# Patient Record
(demographics unavailable — no encounter records)

---

## 2024-10-21 NOTE — HISTORY OF PRESENT ILLNESS
[Stable] : stable [de-identified] : 74 year old female presents for evaluation of right sided back pain x several months.  She had similar pain a few years ago which resolved on its own. She states that she has intermittent radiating pain down the RLE to the anterior and posterior thigh. She states that she mostly has back pain. Has numbness of the hands and feet. Laying down, sitting aggravates the pain. Standing alleviates the pain. She takes tramadol PRN prescribed by her PCP, as well as CBD. Also takes tylenol for the pain.  Can not take NSAIDs due to gastritis.  Denies PRAMOD. Was referred to PT at last visit. Has been participating with PT twice a week for the past 2 weeks but is feeling worse.  She now has pain for the past week radiating up the back and down the ulnar aspect of the LUE to the fingers, with numbness/tingling. Denies weakness.  Has MRI Lumbar.  PMHx: gastritis, HTN, HLD, history of breast cancer s/p chemo/RT  No fever chills sweats nausea vomiting no bowel or bladder dysfunction, no recent weight loss or gain no night pain. This history is in addition to the intake form that I personally reviewed.

## 2024-10-21 NOTE — DISCUSSION/SUMMARY
[de-identified] : L5-S1 spondylolisthesis with moderate-severe stenosis. L4-5 moderate stenosis. Lumbar disc degenerative disease. Cervical degenerative disc disease. +left rotator cuff pathology. Discussed all options. Discussed exercise do's and don'ts. Referral for physical therapy. All options discussed including rest, medicine, home exercise, acupuncture, Chiropractic care, Physical Therapy, Pain management, and last resort surgery. All questions were answered, all alternatives discussed, and the patient is in complete agreement with the treatment plan which the patient contributed to and discussed with me through the shared decision-making process. Follow-up appointment as instructed. Any issues and the patient will call or come in sooner.  agrees with plan.

## 2024-10-21 NOTE — PHYSICAL EXAM
[Normal] : Gait: normal [Sandhu's Sign] : negative Sandhu's sign [Pronator Drift] : negative pronator drift [SLR] : negative straight leg raise [de-identified] : 5 out of 5 motor strength, sensation is intact and symmetrical full range of motion flexion extension and rotation, no palpatory tenderness full range of motion of hips knees shoulders and elbows (all four extremities), no atrophy, negative straight leg raise, no pathological reflexes, no swelling, normal ambulation, no apparent distress skin is intact, no palpable lymph nodes, no upper or lower extremity instability, alert and oriented x3 and normal mood. Normal finger-to nose test. No upper motor neuron findings. Pain with cervical extension. +Left Hawkin's +Left Neer's +Drop arm test [de-identified] : XR AP Lat Cervical 10/21/2024 -cervical degenerative disc disease- reviewed with patient.   XR AP Lat Lumbar 09/24/2024 -L5-S1 spondylolisthesis, Lumbar disc degenerative disease- reviewed with patient.   I reviewed, interpreted and clinically correlated the following outside imaging studies, MR SPINE LUMBAR - ORDERED BY: CANDIS CHAPA   PROCEDURE DATE: 09/25/2024    INTERPRETATION: CLINICAL INFORMATION: Back pain  ADDITIONAL CLINICAL INFORMATION: Spondylolisthesis M43.16  TECHNIQUE: Multiplanar, multisequence MRI was performed of the lumbar spine. IV Contrast: NONE  PRIOR STUDIES: MRI of the lumbar spine performed 8/24/2018  FINDINGS:  LOCALIZER: No additional findings. BONES: There is no fracture. There are Modic type I changes at L1-L2 and L3-L4. ALIGNMENT: There is levocurvature of the lumbar spine. There is mild/moderate retrolisthesis at L1-L2 and less so at L2-L3. There is grade 1/2 anterolisthesis at L5-S1. SACROILIAC JOINTS/SACRUM: There is no sacral fracture. The SI joints are partially visualized but are intact. CONUS AND CAUDA EQUINA: The distal cord and conus are normal in signal. Conus terminates at L1. VISUALIZED INTRAPELVIC/INTRA-ABDOMINAL SOFT TISSUES: Normal. PARASPINAL SOFT TISSUES: Normal.   INDIVIDUAL LEVELS:  LOWER THORACIC SPINE: No spinal canal or neuroforaminal stenosis.  L1-L2: Mild/moderate retrolisthesis. Severe disc height loss, endplate osteophyte formation, and broad-based disc bulge with superimposed paracentral disc extrusion. Findings result in mild spinal canal stenosis in the AP dimension. Bilateral facet arthropathy is present. Moderate right greater than left neural foraminal stenosis. L2-L3: Mild retrolisthesis. Moderate disc height loss, endplate osteophyte formation, and broad-based disc bulge resulting in flattening of the ventral thecal sac. There is mild spinal canal stenosis. Bilateral facet arthropathy is present. There is mild left neural foraminal stenosis. No right neural foraminal stenosis. L3-L4: Moderate disc height loss, endplate osteophyte formation, and broad-based disc bulge resulting in mild-to-moderate spinal canal stenosis. Bilateral facet arthropathy. Moderate left greater than right neural foraminal stenosis. L4-L5: Moderate severe disc height loss, endplate osteophyte formation, and broad-based disc bulge resulting in flattening the ventral thecal sac. Moderate spinal canal stenosis. Moderate left greater than right neural foraminal stenosis. L5-S1: Grade 1/2 anterolisthesis. Moderate disc height loss, endplate osteophyte formation, and broad-based disc bulge resulting in moderate to severe spinal canal stenosis. Bilateral facet arthropathy. Moderate bilateral neural foraminal stenosis.   IMPRESSION:  MRI of the lumbar spine demonstrates multilevel spondylosis with varying degrees of mild-to-moderate spinal canal and neural foraminal stenosis as described. Overall disease is progressed compared to prior MRI performed 8/24/2018.  At L1-L2 there is interval increase in mild/moderate retrolisthesis and disc height loss. Moderate right greater than left neural foraminal stenosis, slightly increased.  L3-L4, there is interval increase in degenerative disc disease. Mild/moderate spinal canal stenosis and moderate left greater than right neural foraminal stenosis, increased from prior.  L4-L5, there is moderate spinal canal stenosis and moderate left and right neural foraminal stenosis, unchanged.  L5-S1, there is grade 1/2 anterolisthesis with moderate to severe spinal canal stenosis and moderate bilateral neural foraminal stenosis, increased from prior study.  --- End of Report ---

## 2024-10-21 NOTE — ADDENDUM
[FreeTextEntry1] : This note was written by Landen Florez on 10/21/2024 acting as scribe for Dr. Seamus Barbosa M.D.  I, Seamus Barbosa MD, have read and attest that all the information, medical decision making and discharge instructions within are true and accurate.

## 2024-10-28 NOTE — ASSESSMENT
[FreeTextEntry1] : 74 year old woman with osteoporosis, on letrozole for breast cancer, treated with prolia since 3/2016  -  DXA Performed today, pt with artifactual changes in spine, stable at wrist and and T hip.     - Continue Prolia,  administered today without complication, [source:stock]  - repeat dxa  in 2 years  - check cmp   Vitamin D def'y  - continue vitamin D supplementation  HTN -  Blood pressure at goal.  Continue amlodipine  f/u 6 months for prolia

## 2024-10-28 NOTE — PHYSICAL EXAM
[Alert] : alert [Healthy Appearance] : healthy appearance [No Acute Distress] : no acute distress [Normal Sclera/Conjunctiva] : normal sclera/conjunctiva [No Proptosis] : no proptosis [No LAD] : no lymphadenopathy [Thyroid Not Enlarged] : the thyroid was not enlarged [No Respiratory Distress] : no respiratory distress [Clear to Auscultation] : lungs were clear to auscultation bilaterally [Normal S1, S2] : normal S1 and S2 [Regular Rhythm] : with a regular rhythm [No Edema] : no peripheral edema [Normal Bowel Sounds] : normal bowel sounds [Not Tender] : non-tender [Soft] : abdomen soft [No Spinal Tenderness] : no spinal tenderness [Kyphosis] : kyphosis present [No Involuntary Movements] : no involuntary movements were seen [Normal Strength/Tone] : muscle strength and tone were normal [Normal Reflexes] : deep tendon reflexes were 2+ and symmetric [No Tremors] : no tremors [Normal Affect] : the affect was normal [Normal Mood] : the mood was normal

## 2024-10-28 NOTE — HISTORY OF PRESENT ILLNESS
[FreeTextEntry1] : cc: osteoporosis  74 year old woman with history of breast cancer,  on letrozole, with osteoporosis, treated with prolia since 3/2016.   She takes supplemental vitamin D and calcium, consumes minimal dairy in her diet, . She reports no recent falls or fractures.    She has regular dental care . Reports no thigh pain.   Has been exercising daily Has had back and arm pain, tendonitis.

## 2024-11-01 NOTE — HISTORY OF PRESENT ILLNESS
[FreeTextEntry1] : RPV- lichen planus [de-identified] : Ms. CARMELO BOND  is a 74 year  y/o F with hx of stage 4 breast cancer (since 2003 on letrozole) here for evaluation of below   # F/u Lichen planus Since LV 04/2024, patient notes no improvement in lesions on legs and says they are progressing to arms now. Very itchy especially at night.  Continues to have oral involvement but asymptomatic. Using topical clobetasol very rarely PRN to aa on legs, discontinued use as endorses skin thinning.  on NBUVB most recently 1000 mJ 1 m 48 seconds for the last 6-7 tx; 23 total tx so far not getting red, itchy, or burning after phototherapy thinks she is starting to improve maybe the itch of her lesions is getting better, mostly on legs  Background: rash on the b/l shins began in 2017. At the time she had bx confirming diagnosis of LP. Since then she has had flares on/off and used topical corticosteroids with limited improvement, and ILK which seems to give her more lasting improvement but was difficult to tolerate due to numerous affected areas on the arms and legs. More recently in the last few months she has been flaring and developing new itchy red spots on the legs and arms that are a 15 out of 10 in itch intensity especially at night, causing her to wake up in the night due to itch. No itchy household contacts. She follows with Dr. Alves (oral pathology) for her oral LP which she feels has been mostly stable with no treatment and only rarely flares. Denies any genital pain or lesions, and follows with GYN regularly.  Of note, in the last few months the patient reports taking new blood pressure and cholesterol medication but does not recall some of the specific names.   Personal hx of skin cancer: no FHx of skin cancer: no Social Hx: here accompanied by her .

## 2024-11-01 NOTE — PHYSICAL EXAM
[Alert] : alert [Oriented x 3] : ~L oriented x 3 [Well Nourished] : well nourished [Conjunctiva Non-injected] : conjunctiva non-injected [No Visual Lymphadenopathy] : no visual  lymphadenopathy [No Clubbing] : no clubbing [No Edema] : no edema [No Bromhidrosis] : no bromhidrosis [No Chromhidrosis] : no chromhidrosis [FreeTextEntry3] : Wickman stria in the buccal cheek's b/l and lateral aspect of the tongue scattered pink to purple flat topped papules on the distal arms and legs, (legs> arms) some in linear distribution and some excoriations atrophic plaque on R shin

## 2024-11-01 NOTE — ASSESSMENT
[FreeTextEntry1] : # Lichen Planus--Chronic, progressive Biopsy supportive of lichenoid drug reaction, however medication history unremarkable. Given clinical history or exam, favor treating for LP as below Failed tcs  We have discussed the nature and course of this condition. I have discussed the goals of therapy with the patient. We have discussed treatment options and expectations from treatment including topical vs ILK, phototherapy, excimer laser, and systemic immunosuppression with MTX, Mycophenolate Mofetil or acitretin. Patient hesitant to begin systemic immunosuppression due to cancer history and prefers to begin treatment with phototherapy. Will cont phototherapy as below. - For AA on the body- May c/w clobet ointment BID for 2 weeks on 1 week off. Clobetasol wraps at night prn  - long term steroid use side effects such as skin atrophy, hypopigmentation and telangiectasis discussed previously, pt uses sparingly already because she is worried - encouraged pt to use TCS under occlusion for 3-5 days and she is bleeding from scratching  - c/w phototherapy 2-3x/week, INCREASE to 1500 mJ max dose; ensure mineral oil application during treatments  RTC 2 months

## 2024-11-01 NOTE — HISTORY OF PRESENT ILLNESS
[FreeTextEntry1] : RPV- lichen planus [de-identified] : Ms. CARMELO BOND  is a 74 year  y/o F with hx of stage 4 breast cancer (since 2003 on letrozole) here for evaluation of below   # F/u Lichen planus Since LV 04/2024, patient notes no improvement in lesions on legs and says they are progressing to arms now. Very itchy especially at night.  Continues to have oral involvement but asymptomatic. Using topical clobetasol very rarely PRN to aa on legs, discontinued use as endorses skin thinning.  on NBUVB most recently 1000 mJ 1 m 48 seconds for the last 6-7 tx; 23 total tx so far not getting red, itchy, or burning after phototherapy thinks she is starting to improve maybe the itch of her lesions is getting better, mostly on legs  Background: rash on the b/l shins began in 2017. At the time she had bx confirming diagnosis of LP. Since then she has had flares on/off and used topical corticosteroids with limited improvement, and ILK which seems to give her more lasting improvement but was difficult to tolerate due to numerous affected areas on the arms and legs. More recently in the last few months she has been flaring and developing new itchy red spots on the legs and arms that are a 15 out of 10 in itch intensity especially at night, causing her to wake up in the night due to itch. No itchy household contacts. She follows with Dr. Alves (oral pathology) for her oral LP which she feels has been mostly stable with no treatment and only rarely flares. Denies any genital pain or lesions, and follows with GYN regularly.  Of note, in the last few months the patient reports taking new blood pressure and cholesterol medication but does not recall some of the specific names.   Personal hx of skin cancer: no FHx of skin cancer: no Social Hx: here accompanied by her .

## 2024-11-16 NOTE — ASSESSMENT
[FreeTextEntry1] : 73 y/o female who was diagnosis of stage III breast cancer at age 40 in 1990. She underwent mastectomy and left axillary lymph node dissection with 11/12 axillary nodes involved with metastatic disease. The patient received adjuvant chemotherapy over a 3-1/2 year period from Dr. Bhanu Mg including Cytoxan Adriamycin 5-FU methotrexate and BCG. She subsequently took tamoxifen for 10 years followed by raloxifene for 2 years, which was discontinued in July 2003, at the time of her recurrence. In July 2003 she developed a chest wall recurrence. This was excised with negative margins. She then received radiation therapy. The patient commenced treatment with letrozole in December 2003, which she continues to take to the present time.   - doing well on letrozole without AEs - given lack of severe side-effects, will continue for now as she was treated for metastatic chest wall recurrence - VISHAL x >20 years from chest wall recurrence -No longer following with breast surgeon Dr. Lima. -Labs today including chem panel, CA27-29  Osteoporosis - continue f/up with endocrinology Dr. Wolf - continue Prolia per endocrinology every 6 monnths - BMD from 10/2024 reviewed; repeat annually as per Dr. Wolf  - Patient had the opportunity to have all their questions answered to their satisfaction - f/u in 6 months or sooner if needed .

## 2024-11-16 NOTE — HISTORY OF PRESENT ILLNESS
[M: ___] : M[unfilled] [AJCC Stage: ____] : AJCC Stage: [unfilled] [Treatment Protocol] : Treatment Protocol [100: Normal, no complaints, no evidence of disease.] : 100: Normal, no complaints, no evidence of disease. [de-identified] : 73 y/o female who presents for breast medical oncology follow up.  The patient had a diagnosis of stage III breast cancer at age 40 in 1990. She underwent mastectomy and left axillary lymph node dissection with 11/12 axillary nodes involved with disease.  The patient received adjuvant chemotherapy over a 3-1/2 year period from Dr. Bhanu Mg including Cytoxan Adriamycin 5-FU methotrexate and BCG. She subsequently took tamoxifen for 10 years followed by raloxifene for 2 years, which was discontinued in July 2003, at the time of her recurrence.  July 2003 she developed a chest wall recurrence. This was excised with negative margins. She then received radiation therapy.  The patient commenced treatment with letrozole in December 2003, which she continues to take to the present time.  The patient has had no evidence of active breast cancer since starting letrozole.  Comprehensive BRACAnalysis was negative in May 2008. CancerNext 32 gene panel was negative on 12/1/2015.   [de-identified] : Infiltrating ductal carcinoma [FreeTextEntry1] :  Letrozole start 12/2003  to present [de-identified] : 11/15/24 Patient presents today to rule out metastatic breast cancer and assess treatment toxicity. She is doing well on letrozole without AEs (started 12/2003) after chest wall recurrence Unilateral Right mammo/sono  3/2024- BIRADS 2  Left axillary U/S in 8/2024 was negative for any clear finding;  Patient denies any SOB, CP, abdominal pain, bone pain, headache, or unexplained weight loss Patient denies any hotflashes, arthralgias, vaginal dryness, vaginal bleeding, hair loss, muscle cramps.  HEALTH MAINTENANCE  PCP Dr. Aba Wallace, 1/2023 OPHTHO cataract surgery  GI Cscope Dr Price, yearly colonoscopies due to precancerous polyps - last 2021 - PAST due**  GYN Dr. Dinero, 11/29/21 no vaginal bleeding or spotting. Has not followed up since  DERM  Dx: lichen planus - treated w/ cortisone cream; seen by dermatologist one year ago;  BMD previously with osteoporosis on Prolia every 6 months with Dr. Shelby Wolf, on calcium/vit D, last BMD 10/2024 continued improvement on Prolia every 6 months;

## 2024-11-16 NOTE — REASON FOR VISIT
[Follow-Up Visit] : a follow-up [FreeTextEntry2] : Metastatic breast cancer with chest wall recurrence

## 2024-11-16 NOTE — ASSESSMENT
[FreeTextEntry1] : 75 y/o female who was diagnosis of stage III breast cancer at age 40 in 1990. She underwent mastectomy and left axillary lymph node dissection with 11/12 axillary nodes involved with metastatic disease. The patient received adjuvant chemotherapy over a 3-1/2 year period from Dr. Bhanu Mg including Cytoxan Adriamycin 5-FU methotrexate and BCG. She subsequently took tamoxifen for 10 years followed by raloxifene for 2 years, which was discontinued in July 2003, at the time of her recurrence. In July 2003 she developed a chest wall recurrence. This was excised with negative margins. She then received radiation therapy. The patient commenced treatment with letrozole in December 2003, which she continues to take to the present time.   - doing well on letrozole without AEs - given lack of severe side-effects, will continue for now as she was treated for metastatic chest wall recurrence - VISHAL x >20 years from chest wall recurrence -No longer following with breast surgeon Dr. Lima. -Labs today including chem panel, CA27-29  Osteoporosis - continue f/up with endocrinology Dr. Wolf - continue Prolia per endocrinology every 6 monnths - BMD from 10/2024 reviewed; repeat annually as per Dr. Wolf  - Patient had the opportunity to have all their questions answered to their satisfaction - f/u in 6 months or sooner if needed .

## 2024-11-16 NOTE — PHYSICAL EXAM
[Fully active, able to carry on all pre-disease performance without restriction] : Status 0 - Fully active, able to carry on all pre-disease performance without restriction [Normal] : full range of motion and no deformities appreciated [de-identified] : Left  mastectomy with silicone implant reconstruction, no chest wall masses.  Right breast: reduction mammoplasty with well healed incisions, no mass, right axilla small mobile LN (unchanged) [de-identified] : no palpable skin lesions

## 2024-11-16 NOTE — PHYSICAL EXAM
[Fully active, able to carry on all pre-disease performance without restriction] : Status 0 - Fully active, able to carry on all pre-disease performance without restriction [Normal] : full range of motion and no deformities appreciated [de-identified] : Left  mastectomy with silicone implant reconstruction, no chest wall masses.  Right breast: reduction mammoplasty with well healed incisions, no mass, right axilla small mobile LN (unchanged) [de-identified] : no palpable skin lesions

## 2024-11-16 NOTE — REVIEW OF SYSTEMS
[de-identified] : Here for cpe 1. Thyroid nodule due for f/u u/s 2. Hx hodgkins treated 7y ago. abvd. gets serial ekg, echo, labs, vaccines, pfts at [facility] [place]. saw np this .  3. delivered healthy baby 4m ago. . doing well. breastfeeding. moods doing well. pnv. back to work. declines contraception. periods haven't resumed. ok with getting pregnant again.  4. hm utd derm pap vaccines. due for ophtho. utd dental. fhx skin cancer.  5. leg swelling pregnancy resolving.  [Negative] : Allergic/Immunologic [de-identified] : lichen planus

## 2024-11-16 NOTE — HISTORY OF PRESENT ILLNESS
[M: ___] : M[unfilled] [AJCC Stage: ____] : AJCC Stage: [unfilled] [Treatment Protocol] : Treatment Protocol [100: Normal, no complaints, no evidence of disease.] : 100: Normal, no complaints, no evidence of disease. [de-identified] : 73 y/o female who presents for breast medical oncology follow up.  The patient had a diagnosis of stage III breast cancer at age 40 in 1990. She underwent mastectomy and left axillary lymph node dissection with 11/12 axillary nodes involved with disease.  The patient received adjuvant chemotherapy over a 3-1/2 year period from Dr. Bhanu Mg including Cytoxan Adriamycin 5-FU methotrexate and BCG. She subsequently took tamoxifen for 10 years followed by raloxifene for 2 years, which was discontinued in July 2003, at the time of her recurrence.  July 2003 she developed a chest wall recurrence. This was excised with negative margins. She then received radiation therapy.  The patient commenced treatment with letrozole in December 2003, which she continues to take to the present time.  The patient has had no evidence of active breast cancer since starting letrozole.  Comprehensive BRACAnalysis was negative in May 2008. CancerNext 32 gene panel was negative on 12/1/2015.   [de-identified] : Infiltrating ductal carcinoma [FreeTextEntry1] :  Letrozole start 12/2003  to present [de-identified] : 11/15/24 Patient presents today to rule out metastatic breast cancer and assess treatment toxicity. She is doing well on letrozole without AEs (started 12/2003) after chest wall recurrence Unilateral Right mammo/sono  3/2024- BIRADS 2  Left axillary U/S in 8/2024 was negative for any clear finding;  Patient denies any SOB, CP, abdominal pain, bone pain, headache, or unexplained weight loss Patient denies any hotflashes, arthralgias, vaginal dryness, vaginal bleeding, hair loss, muscle cramps.  HEALTH MAINTENANCE  PCP Dr. Aba Wallace, 1/2023 OPHTHO cataract surgery  GI Cscope Dr Price, yearly colonoscopies due to precancerous polyps - last 2021 - PAST due**  GYN Dr. Dinero, 11/29/21 no vaginal bleeding or spotting. Has not followed up since  DERM  Dx: lichen planus - treated w/ cortisone cream; seen by dermatologist one year ago;  BMD previously with osteoporosis on Prolia every 6 months with Dr. Shelby Wolf, on calcium/vit D, last BMD 10/2024 continued improvement on Prolia every 6 months;

## 2025-01-31 NOTE — HISTORY OF PRESENT ILLNESS
[FreeTextEntry1] : RPV- lichen planus [de-identified] : Ms. CARMELO BOND  is a 74 year  y/o F with hx of stage 4 breast cancer (since 2003 on letrozole) here for evaluation of below   # F/u Lichen planus Since LV patient notes minimal improvement in lesions on legs, not using topicals much, just reached goal NBUVB 1500mJ dose 6 weeks ago Continues to have oral involvement but asymptomatic.  not getting red after phototherapy but does get itchy  Background: rash on the b/l shins began in 2017. At the time she had bx confirming diagnosis of LP. Since then she has had flares on/off and used topical corticosteroids with limited improvement, and ILK which seems to give her more lasting improvement but was difficult to tolerate due to numerous affected areas on the arms and legs. More recently in the last few months she has been flaring and developing new itchy red spots on the legs and arms that are a 15 out of 10 in itch intensity especially at night, causing her to wake up in the night due to itch. No itchy household contacts. She follows with Dr. Alves (oral pathology) for her oral LP which she feels has been mostly stable with no treatment and only rarely flares. Denies any genital pain or lesions, and follows with GYN regularly.  Of note, in the last few months the patient reports taking new blood pressure and cholesterol medication but does not recall some of the specific names.   Personal hx of skin cancer: no FHx of skin cancer: no Social Hx: here accompanied by her .

## 2025-01-31 NOTE — PHYSICAL EXAM
[Alert] : alert [Oriented x 3] : ~L oriented x 3 [Well Nourished] : well nourished [Conjunctiva Non-injected] : conjunctiva non-injected [No Visual Lymphadenopathy] : no visual  lymphadenopathy [No Clubbing] : no clubbing [No Edema] : no edema [No Bromhidrosis] : no bromhidrosis [No Chromhidrosis] : no chromhidrosis [FreeTextEntry3] : Wickman stria in the buccal cheek's b/l and lateral aspect of the tongue scattered pink to purple flat topped papules on the distal arms and legs, (legs> arms) some in linear distribution and some excoriations

## 2025-05-06 NOTE — ASSESSMENT
[FreeTextEntry1] : 74 year old woman with osteoporosis, on letrozole for breast cancer, treated with prolia since 3/2016  -  DXA  last visit with artifactual changes in spine, stable at wrist and and T hip.     - Continue Prolia,  administered today without complication, [source:stock]  - repeat dxa  in 1.5 years  - check cmp   Vitamin D def'y  - continue vitamin D supplementation  HTN -  .  Continue amlodipine  f/u 6 months for prolia

## 2025-05-06 NOTE — HISTORY OF PRESENT ILLNESS
[FreeTextEntry1] : cc: osteoporosis  74 year old woman with history of breast cancer,  on letrozole, with osteoporosis, treated with prolia since 3/2016. She fell over her luggage while traveling, no fractures.   She takes supplemental vitamin D and calcium, consumes minimal dairy in her diet. Has been exercising daily   She has regular dental care, will need root canal . Reports no thigh pain.

## 2025-05-13 NOTE — DISCUSSION/SUMMARY
[FreeTextEntry1] : CARMELO is here for NBUVB therapy treatment for lichen planus L 43.9 Starting dose is 250 mJ increasing by 10% as tolerated three times a week, holding dose at 1000 mJ as Dr Riley prescribed. Patient is evaluated by Dr. Riley to continue tx 2 x per week increasing dose to 1500Mj.  Pt tolerated treatment well, mineral oil applied.  Today's treatment:  5/13/25 1504 mJ 2 m 36 seconds. (Moseley 2 UVB output is 14.8) treatment history:  5/8/25 1504 mJ 2 m 36 seconds. (Moseley 2 UVB output is 14.8)  5/1/25 1504 mJ 2 m 36 seconds. (Moseley 2 UVB output is 14.8) 4/29/25 1504 mJ 2 m 36 seconds. (Moseley 2 UVB output is 14.8)  4/24/25 1504 mJ 2 m 36 seconds. (Moseley 2 UVB output is 14.8) 4/22/25 1504 mJ 2 m 36 seconds. (Moseley 2 UVB output is 14.8)  4/17/25 1504 mJ 2 m 36 seconds. (Moseley 2 UVB output is 14.8) 4/10/25 1500 mJ 6 m 50 seconds. (Moseley 1 UVB OUTPUT is 5.2)   4/8/25 1500 mJ 6 m 50 seconds. (Moseley 1 UVB OUTPUT is 5.2)    4/3/25 1500 mJ 6 m 50 seconds. (Moseley 1 UVB OUTPUT is 5.2)    4/1/25 1500 mJ 6 m 50 seconds. (Moseley 1 UVB OUTPUT is 5.2)    3/25/25 1500 mJ 6 m 50 seconds. (Moseley 1 UVB OUTPUT is 5.2)    3/18/25 1500 mJ 6 m 50 seconds. (Moseley 1 UVB OUTPUT is 5.2)    3/13/25 1500 mJ 6 m 50 seconds. (Moseley 1 UVB OUTPUT is 5.2)    3/11/25 1500 mJ 6 m 50 seconds. (Moseley 1 UVB OUTPUT is 5.2)    3/6/25 1500 mJ 6 m 50 seconds. (Moseley 1 UVB OUTPUT is 5.2)     3/4/25 1500 mJ 6 m 50 seconds. (Moseley 1 UVB OUTPUT is 5.2)  2/27/25 1500 mJ 6 m 50 seconds. (Moseley 1 UVB OUTPUT is 5.2)   2/25/25 1500 mJ 6 m 50 seconds. (Moseley 1 UVB OUTPUT is 5.2)   2/18/25 1500 mJ 6 m 50 seconds. (Moseley 1 UVB OUTPUT is 5.2)   2/13/25 1500 mJ 6 m 50 seconds. (Moseley 1 UVB OUTPUT is 5.2)    2/11/25 1500 mJ 6 m 50 seconds. (Moseley 1 UVB OUTPUT is 5.2)    2/4/25 1500 mJ 6 m 50 seconds. (Msoeley 1 UVB OUTPUT is 5.2)    1/30/25 1500 mJ 6 m 50 seconds. (Moseley 1 UVB OUTPUT is 5.2)    1/28/25 1500 mJ 6 m 50 seconds. (Moseley 1 UVB OUTPUT is 5.2)    1/14/25 1500 mJ 6 m 50 seconds. (Moseley 1 UVB OUTPUT is 5.2)   1/9/25 1500 mJ 6 m 50 seconds. (Moseley 1 UVB OUTPUT is 5.2)    1/7/25 1500 mJ 6 m 50 seconds. (Moseley 1 UVB OUTPUT is 5.2)  1/2/24 1500 mJ 6 m 50 seconds. (Moseley 1 UVB OUTPUT is 5.2)   12/31/24 1500 mJ 6 m 50 seconds. (Moseley 1 UVB OUTPUT is 5.2)   12/26/24 1500 mJ 6 m 50 seconds. (Moseley 1 UVB OUTPUT is 5.2)  12/19/24 1500 mJ 6 m 50 seconds. (Moseley 1 UVB OUTPUT is 5.2)   12/17/24 1500 mJ 6 m 50 seconds. (Moseley 1 UVB OUTPUT is 5.2)    12/12/24 1500 mJ 6 m 50 seconds. (Moseley 1 UVB OUTPUT is 5.2) 12/10/24 1500 mJ 6 m 50 seconds. (Moseley 1 UVB OUTPUT is 5.2)    11/26/24 1463 mJ 6 m 45 seconds. (Moseley 1 UVB OUTPUT is 5.2)   11/21/24 1330 mJ 6 m 10 seconds. (Moseley 1 UVB OUTPUT is 5.2)   11/19/24 1210 mJ 5 m 37 seconds. (Moseley 1 UVB OUTPUT is 5.2)   11/14/24 1099 mJ 5 m 7 seconds.  (Moseley 2 UVB output is 14.8)  11/12/24 1000 mJ 1 m 48 seconds.  (Moseley 2 UVB output is 14.8) 11/7/24 1000 mJ 1 m 48 seconds.  (Moseley 2 UVB output is 14.8) 11/5/24 1000 mJ 1 m 48 seconds.  (Moseley 2 UVB output is 14.8) 10/31/24 1000 mJ 1 m 48 seconds.  (Moseley 2 UVB output is 14.8) 10/29/24 1000 mJ 1 m 48 seconds.  (Moseley 2 UVB output is 14.8) 10/22/24 1000 mJ 1 m 48 seconds.  (Moseley 2 UVB output is 14.8)  10/22/24 1000 mJ 1 m 48 seconds.  (Moseley 2 UVB output is 14.8) 10/17/24 1000 mJ 1 m 48 seconds.  (Moseley 2 UVB output is 14.8)  10/15/24 1000 mJ 1 m 48 seconds.  (Moseley 2 UVB output is 14.8) 10/10/24 1000 mJ 1 m 48 seconds.  (Moseley 2 UVB output is 14.8) 10/8/24 1000 mJ 1 m 48 seconds.  (Moseley 2 UVB output is 14.8) 10/1/24 956 mJ 2 m 1 seconds.  (Moseley 2 UVB output is 14.8) 9/27/24 864 mJ 1 m 58 seconds.  (Moseley 2 UVB output is 14.8) 9/24/24 791 mJ 1 m 31 seconds.  (Moseley 2 UVB output is 14.8) 9/20/24 717 mJ 1 m 23 seconds.  (Moseley 2 UVB output is 14.8) 9/17/24 651 mJ 3 m 5 seconds.  (Moseley 1 UVB OUTPUT is 5.2) 9/13/24 596 mJ 1 m 25 seconds.  (Msoeley 2 UVB output is 14.8) 9/10/24 538 mJ 2 m 38 seconds. (Moseley 1 UVB OUTPUT is 5.2)   9/6/24 488 mJ 1 m 11 seconds (Moseley 2 UVB output is 14.8)  9/3/24 446 mJ 2 m 13 seconds (Moseley 1 UVB OUTPUT is 5.2)  8/30/24 407 mJ 0 m 56 seconds (Moseley 1 UVB OUTPUT is 5.2)  8/27/24 366 mJ 1 m 42 seconds (Moseley 1 UVB OUTPUT is 5.2)  8/23/24 336 mJ 0 m 48 seconds (Moseley 2 UVB output is 14.8)  8/20/24 308 mJ 0 m 39 seconds (Moseley 2 UVB output is 14.8)   8/16/24 276 mJ 0 m 40 seconds (Moseley 2 UVB output is 14.8) 8/13/24 251 mJ 1 m 14 seconds

## 2025-05-19 NOTE — HISTORY OF PRESENT ILLNESS
[FreeTextEntry1] : RPV- lichen planus [de-identified] : Ms. CARMELO BOND  is a 74 year  y/o F with hx of stage 4 breast cancer (since 2003 on letrozole) here for evaluation of below   # F/u Lichen planus Since LV patient notes minimal improvement in lesions on legs, not using topicals much, has been on NBUVB at 1500mJ x 5 months Continues to have oral involvement but asymptomatic.   Background: rash on the b/l shins began in 2017. At the time she had bx confirming diagnosis of LP. Since then she has had flares on/off and used topical corticosteroids with limited improvement, and ILK which seems to give her more lasting improvement but was difficult to tolerate due to numerous affected areas on the arms and legs. More recently in the last few months she has been flaring and developing new itchy red spots on the legs and arms that are a 15 out of 10 in itch intensity especially at night, causing her to wake up in the night due to itch. No itchy household contacts. She follows with Dr. Alves (oral pathology) for her oral LP which she feels has been mostly stable with no treatment and only rarely flares. Denies any genital pain or lesions, and follows with GYN regularly.  Of note, in the last few months the patient reports taking new blood pressure and cholesterol medication but does not recall some of the specific names.   Personal hx of skin cancer: no FHx of skin cancer: no Social Hx: here accompanied by her .

## 2025-05-19 NOTE — ASSESSMENT
[FreeTextEntry1] : # Lichen Planus--Chronic, progressive now with a component of # Prurigo Biopsy supportive of lichenoid drug reaction, however medication history unremarkable. Given clinical history or exam, favor treating for LP as below, with concomitant prurigo Failed tcs and now at least 5 months of therapeutic NBUVB We have discussed the nature and course of this condition. I have discussed the goals of therapy with the patient. We have discussed treatment options and expectations from treatment including topical vs ILK, dupixent, phototherapy, excimer laser, and systemic immunosuppression with MTX, Mycophenolate Mofetil or acitretin. Patient hesitant to begin systemic immunosuppression due to cancer history.  - For AA on the body- May c/w clobet ointment BID for 2 weeks on 1 week off. Clobetasol wraps at night prn  - long term steroid use side effects such as skin atrophy, hypopigmentation and telangiectasis discussed previously, pt uses sparingly already because she is worried - encouraged pt to use TCS under occlusion for 3-5 days and she is bleeding from scratching  - c/w phototherapy 2-3x/week,c/w 1500 mJ max dose; ensure mineral oil application during treatments - discussed Dupixent as potential tx, will evaluate insurance coverage and approval. SED including conjunctivitis, common cold, HSV.   RTC injection training

## 2025-06-13 NOTE — HISTORY OF PRESENT ILLNESS
[M: ___] : M[unfilled] [AJCC Stage: ____] : AJCC Stage: [unfilled] [Treatment Protocol] : Treatment Protocol [100: Normal, no complaints, no evidence of disease.] : 100: Normal, no complaints, no evidence of disease. [de-identified] : 75 y/o female who presents for breast medical oncology follow up.  The patient had a diagnosis of stage III breast cancer at age 40 in 1990. She underwent mastectomy and left axillary lymph node dissection with 11/12 axillary nodes involved with disease.  The patient received adjuvant chemotherapy over a 3-1/2 year period from Dr. Bhanu Mg including Cytoxan Adriamycin 5-FU methotrexate and BCG. She subsequently took tamoxifen for 10 years followed by raloxifene for 2 years, which was discontinued in July 2003, at the time of her recurrence.  July 2003 she developed a chest wall recurrence. This was excised with negative margins. She then received radiation therapy.  The patient commenced treatment with letrozole in December 2003, which she continues to take to the present time.  The patient has had no evidence of active breast cancer since starting letrozole.  Comprehensive BRACAnalysis was negative in May 2008. CancerNext 32 gene panel was negative on 12/1/2015.   [de-identified] : Infiltrating ductal carcinoma [FreeTextEntry1] :  Letrozole start 12/2003  to present [de-identified] : 6/11/2025 Patient presents today to rule out metastatic breast cancer and assess treatment toxicity. She is doing well on letrozole without AEs (started 12/2003) after chest wall recurrence Unilateral Right mammo/sono  3/2024- BIRADS 2 Notes a 2 month history of new left breast - reconstructed - discomfort - implant is > 10 years old  Left axillary U/S in 8/2024 was negative for any clear finding;  Patient denies any SOB, CP, abdominal pain, bone pain, headache, or unexplained weight loss Patient denies any hotflashes, arthralgias, vaginal dryness, vaginal bleeding, hair loss, muscle cramps.  HEALTH MAINTENANCE  PCP Dr. Aba Wallace, 1/2023 OPHTHO cataract surgery  GI Cscope Dr Price, yearly colonoscopies due to precancerous polyps - last 2021 - PAST due**  GYN Dr. Dinero,  DERM  Dx: lichen planus - treated w/ cortisone cream; seen by dermatologist one year ago;  BMD previously with osteoporosis on Prolia every 6 months with Dr. Shelby Wolf, on calcium/vit D, last BMD 10/2024 continued improvement on Prolia every 6 months;

## 2025-06-13 NOTE — ASSESSMENT
[FreeTextEntry1] : 73 y/o female who was diagnosis of stage III breast cancer at age 40 in 1990. She underwent mastectomy and left axillary lymph node dissection with 11/12 axillary nodes involved with metastatic disease. The patient received adjuvant chemotherapy over a 3-1/2 year period from Dr. Bhanu Mg including Cytoxan Adriamycin 5-FU methotrexate and BCG. She subsequently took tamoxifen for 10 years followed by raloxifene for 2 years, which was discontinued in July 2003, at the time of her recurrence. In July 2003 she developed a chest wall recurrence. This was excised with negative margins. She then received radiation therapy. The patient commenced treatment with letrozole in December 2003, which she continues to take to the present time.   - doing well on letrozole without AEs - given lack of severe side-effects, will continue for now as she was treated for metastatic chest wall recurrence - VISHAL x >20 years from chest wall recurrence - MRI Breast (no contrast) ordered for implant status - given changes to physical exam of implant, age of implants and new pain - MRI is indicated to r/o implant rupture -No longer following with breast surgeon Dr. Lima. -Labs today including CEA and [id number]; CMP reviewed 5/2025  Osteoporosis - continue f/up with endocrinology Dr. Wolf - continue Prolia per endocrinology every 6 months - BMD from 10/2024 reviewed; repeat annually as per Dr. Wolf  - Patient had the opportunity to have all their questions answered to their satisfaction - f/u in 6 months or sooner if needed .

## 2025-06-13 NOTE — PHYSICAL EXAM
[Fully active, able to carry on all pre-disease performance without restriction] : Status 0 - Fully active, able to carry on all pre-disease performance without restriction [Normal] : affect appropriate [de-identified] : Left  mastectomy with silicone implant reconstruction architectural distortion of LUOQ; increased softness of implant with rippling; , no chest wall masses.  Right breast: reduction mammoplasty with well healed incisions, no mass, right axilla small mobile LN (unchanged) [de-identified] : no palpable skin lesions

## 2025-06-13 NOTE — PHYSICAL EXAM
[Fully active, able to carry on all pre-disease performance without restriction] : Status 0 - Fully active, able to carry on all pre-disease performance without restriction [Normal] : affect appropriate [de-identified] : Left  mastectomy with silicone implant reconstruction architectural distortion of LUOQ; increased softness of implant with rippling; , no chest wall masses.  Right breast: reduction mammoplasty with well healed incisions, no mass, right axilla small mobile LN (unchanged) [de-identified] : no palpable skin lesions

## 2025-06-19 NOTE — ASSESSMENT
[FreeTextEntry1] : # Lichen Planus--Chronic, progressive now with a component of # Prurigo Biopsy supportive of lichenoid drug reaction, however medication history unremarkable. Given clinical history or exam, favor treating for LP as below, with concomitant prurigo Failed tcs  We have discussed the nature and course of this condition. I have discussed the goals of therapy with the patient. We have discussed treatment options and expectations from treatment including topical vs ILK, phototherapy, excimer laser, and systemic immunosuppression with MTX, Mycophenolate Mofetil or acitretin. Patient hesitant to begin systemic immunosuppression due to cancer history. Will cont phototherapy as below. - For AA on the body- May c/w clobet ointment BID for 2 weeks on 1 week off. Clobetasol wraps at night prn  - long term steroid use side effects such as skin atrophy, hypopigmentation and telangiectasis discussed previously, pt uses sparingly already because she is worried - encouraged pt to use TCS under occlusion for 3-5 days and she is bleeding from scratching  - c/w phototherapy 2-3x/week,c/w 1500 mJ max dose; ensure mineral oil application during treatments - discussed Dupixent as potential tx, will evaluate insurance coverage and approval START Dupixent with loading dose of 600 mg once (given as two 300 mg injections), followed by 300 mg once every other week for maintenance. SED including conjunctivitis, common cold, HSV.  # Rhytides - START tretinoin 0.025% with small pea-sized amount to entire face, start slow with once to twice a week and slowly increase as tolerated to nightly. Use moisturizer and sunscreen.  RTC injection training
19-Jun-2025 14:26